# Patient Record
Sex: FEMALE | Race: WHITE
[De-identification: names, ages, dates, MRNs, and addresses within clinical notes are randomized per-mention and may not be internally consistent; named-entity substitution may affect disease eponyms.]

---

## 2017-04-12 ENCOUNTER — HOSPITAL ENCOUNTER (OUTPATIENT)
Dept: HOSPITAL 53 - M LABDRAW1 | Age: 72
End: 2017-04-12
Attending: FAMILY MEDICINE
Payer: COMMERCIAL

## 2017-04-12 DIAGNOSIS — I10: ICD-10-CM

## 2017-04-12 DIAGNOSIS — E83.42: ICD-10-CM

## 2017-04-12 DIAGNOSIS — D50.9: ICD-10-CM

## 2017-04-12 DIAGNOSIS — E78.2: Primary | ICD-10-CM

## 2017-04-12 LAB
ALBUMIN SERPL BCG-MCNC: 3.8 GM/DL (ref 3.2–5.2)
ALBUMIN/GLOB SERPL: 1.27 {RATIO} (ref 1–1.93)
ALP SERPL-CCNC: 79 U/L (ref 45–117)
ALT SERPL W P-5'-P-CCNC: 24 U/L (ref 12–78)
ANION GAP SERPL CALC-SCNC: 9 MEQ/L (ref 8–16)
AST SERPL-CCNC: 13 U/L (ref 15–37)
BASOPHILS # BLD AUTO: 0 K/MM3 (ref 0–0.2)
BASOPHILS NFR BLD AUTO: 0.5 % (ref 0–1)
BILIRUB SERPL-MCNC: 0.3 MG/DL (ref 0.2–1)
BUN SERPL-MCNC: 22 MG/DL (ref 7–18)
CALCIUM SERPL-MCNC: 9.5 MG/DL (ref 8.8–10.2)
CHLORIDE SERPL-SCNC: 104 MEQ/L (ref 98–107)
CHOLEST SERPL-MCNC: 162 MG/DL (ref ?–200)
CO2 SERPL-SCNC: 29 MEQ/L (ref 21–32)
CREAT SERPL-MCNC: 1.26 MG/DL (ref 0.55–1.02)
EOSINOPHIL # BLD AUTO: 0.2 K/MM3 (ref 0–0.5)
EOSINOPHIL NFR BLD AUTO: 2.7 % (ref 0–3)
ERYTHROCYTE [DISTWIDTH] IN BLOOD BY AUTOMATED COUNT: 12.7 % (ref 11.5–14.5)
GFR SERPL CREATININE-BSD FRML MDRD: 44.3 ML/MIN/{1.73_M2} (ref 39–?)
GLUCOSE SERPL-MCNC: 100 MG/DL (ref 83–110)
LARGE UNSTAINED CELL #: 0.1 K/MM3 (ref 0–0.4)
LARGE UNSTAINED CELL %: 1.4 % (ref 0–4)
LYMPHOCYTES # BLD AUTO: 3.2 K/MM3 (ref 1.5–4.5)
LYMPHOCYTES NFR BLD AUTO: 38.1 % (ref 24–44)
MAGNESIUM SERPL-MCNC: 2.4 MG/DL (ref 1.8–2.4)
MCH RBC QN AUTO: 32 PG (ref 27–33)
MCHC RBC AUTO-ENTMCNC: 32.7 G/DL (ref 32–36.5)
MCV RBC AUTO: 97.8 FL (ref 80–96)
MONOCYTES # BLD AUTO: 0.3 K/MM3 (ref 0–0.8)
MONOCYTES NFR BLD AUTO: 3.7 % (ref 0–5)
NEUTROPHILS # BLD AUTO: 4.3 K/MM3 (ref 1.8–7.7)
NEUTROPHILS NFR BLD AUTO: 53.6 % (ref 36–66)
PLATELET # BLD AUTO: 200 K/MM3 (ref 150–450)
POTASSIUM SERPL-SCNC: 4.2 MEQ/L (ref 3.5–5.1)
PROT SERPL-MCNC: 6.8 GM/DL (ref 6.4–8.2)
SODIUM SERPL-SCNC: 142 MEQ/L (ref 136–145)
TRIGL SERPL-MCNC: 150 MG/DL (ref ?–150)
WBC # BLD AUTO: 8.1 K/MM3 (ref 4–10)

## 2017-09-28 ENCOUNTER — HOSPITAL ENCOUNTER (OUTPATIENT)
Dept: HOSPITAL 53 - M LABDRAW1 | Age: 72
End: 2017-09-28
Attending: FAMILY MEDICINE
Payer: COMMERCIAL

## 2017-09-28 DIAGNOSIS — E83.42: ICD-10-CM

## 2017-09-28 DIAGNOSIS — D50.9: ICD-10-CM

## 2017-09-28 DIAGNOSIS — I10: Primary | ICD-10-CM

## 2017-09-28 DIAGNOSIS — E78.2: ICD-10-CM

## 2017-09-28 LAB
ALBUMIN SERPL BCG-MCNC: 3.5 GM/DL (ref 3.2–5.2)
ALBUMIN/GLOB SERPL: 1.17 {RATIO} (ref 1–1.93)
ALP SERPL-CCNC: 68 U/L (ref 45–117)
ALT SERPL W P-5'-P-CCNC: 23 U/L (ref 12–78)
ANION GAP SERPL CALC-SCNC: 7 MEQ/L (ref 8–16)
AST SERPL-CCNC: 14 U/L (ref 15–37)
BASOPHILS # BLD AUTO: 0 10^3/UL (ref 0–0.2)
BASOPHILS NFR BLD AUTO: 0.5 % (ref 0–1)
BILIRUB SERPL-MCNC: 0.2 MG/DL (ref 0.2–1)
BUN SERPL-MCNC: 19 MG/DL (ref 7–18)
CALCIUM SERPL-MCNC: 9.7 MG/DL (ref 8.8–10.2)
CHLORIDE SERPL-SCNC: 106 MEQ/L (ref 98–107)
CHOLEST SERPL-MCNC: 139 MG/DL (ref ?–200)
CO2 SERPL-SCNC: 30 MEQ/L (ref 21–32)
CREAT SERPL-MCNC: 1.2 MG/DL (ref 0.55–1.02)
EOSINOPHIL # BLD AUTO: 0.2 10^3/UL (ref 0–0.5)
EOSINOPHIL NFR BLD AUTO: 2.2 % (ref 0–3)
ERYTHROCYTE [DISTWIDTH] IN BLOOD BY AUTOMATED COUNT: 12.8 % (ref 11.5–14.5)
GFR SERPL CREATININE-BSD FRML MDRD: 46.8 ML/MIN/{1.73_M2} (ref 39–?)
GLUCOSE SERPL-MCNC: 99 MG/DL (ref 83–110)
IMM GRANULOCYTES NFR BLD: 0.3 % (ref 0–0)
LYMPHOCYTES # BLD AUTO: 3.3 10^3/UL (ref 1.5–4.5)
LYMPHOCYTES NFR BLD AUTO: 42.4 % (ref 24–44)
MAGNESIUM SERPL-MCNC: 2.3 MG/DL (ref 1.8–2.4)
MCH RBC QN AUTO: 33 PG (ref 27–33)
MCHC RBC AUTO-ENTMCNC: 33.3 G/DL (ref 32–36.5)
MCV RBC AUTO: 99.1 FL (ref 80–96)
MONOCYTES # BLD AUTO: 0.4 10^3/UL (ref 0–0.8)
MONOCYTES NFR BLD AUTO: 5.3 % (ref 0–5)
NEUTROPHILS # BLD AUTO: 3.9 10^3/UL (ref 1.8–7.7)
NEUTROPHILS NFR BLD AUTO: 49.3 % (ref 36–66)
NRBC BLD AUTO-RTO: 0 % (ref 0–0)
PLATELET # BLD AUTO: 206 10^3/UL (ref 150–450)
POTASSIUM SERPL-SCNC: 4.5 MEQ/L (ref 3.5–5.1)
PROT SERPL-MCNC: 6.5 GM/DL (ref 6.4–8.2)
SODIUM SERPL-SCNC: 143 MEQ/L (ref 136–145)
TRIGL SERPL-MCNC: 174 MG/DL (ref ?–150)
WBC # BLD AUTO: 7.9 10^3/UL (ref 4–10)

## 2017-10-26 ENCOUNTER — HOSPITAL ENCOUNTER (EMERGENCY)
Dept: HOSPITAL 53 - M ED | Age: 72
Discharge: HOME | End: 2017-10-26
Payer: COMMERCIAL

## 2017-10-26 VITALS — HEIGHT: 64 IN | BODY MASS INDEX: 29.94 KG/M2 | WEIGHT: 175.38 LBS

## 2017-10-26 VITALS — SYSTOLIC BLOOD PRESSURE: 143 MMHG | DIASTOLIC BLOOD PRESSURE: 66 MMHG

## 2017-10-26 DIAGNOSIS — R07.9: Primary | ICD-10-CM

## 2017-10-26 DIAGNOSIS — I25.10: ICD-10-CM

## 2017-10-26 DIAGNOSIS — J90: ICD-10-CM

## 2017-10-26 DIAGNOSIS — I10: ICD-10-CM

## 2017-10-26 DIAGNOSIS — E78.5: ICD-10-CM

## 2017-10-26 LAB
ALBUMIN SERPL BCG-MCNC: 4.1 GM/DL (ref 3.2–5.2)
ALBUMIN/GLOB SERPL: 1.28 {RATIO} (ref 1–1.93)
ALP SERPL-CCNC: 75 U/L (ref 45–117)
ALT SERPL W P-5'-P-CCNC: 27 U/L (ref 12–78)
ANION GAP SERPL CALC-SCNC: 10 MEQ/L (ref 8–16)
AST SERPL-CCNC: 14 U/L (ref 15–37)
BASOPHILS # BLD AUTO: 0 10^3/UL (ref 0–0.2)
BASOPHILS NFR BLD AUTO: 0.3 % (ref 0–1)
BILIRUB CONJ SERPL-MCNC: < 0.1 MG/DL (ref 0–0.2)
BILIRUB SERPL-MCNC: 0.3 MG/DL (ref 0.2–1)
BUN SERPL-MCNC: 23 MG/DL (ref 7–18)
CALCIUM SERPL-MCNC: 9.7 MG/DL (ref 8.8–10.2)
CHLORIDE SERPL-SCNC: 101 MEQ/L (ref 98–107)
CO2 SERPL-SCNC: 30 MEQ/L (ref 21–32)
CREAT SERPL-MCNC: 1.33 MG/DL (ref 0.55–1.02)
EOSINOPHIL # BLD AUTO: 0.2 10^3/UL (ref 0–0.5)
EOSINOPHIL NFR BLD AUTO: 2.1 % (ref 0–3)
ERYTHROCYTE [DISTWIDTH] IN BLOOD BY AUTOMATED COUNT: 12.7 % (ref 11.5–14.5)
GFR SERPL CREATININE-BSD FRML MDRD: 41.5 ML/MIN/{1.73_M2} (ref 39–?)
GLUCOSE SERPL-MCNC: 113 MG/DL (ref 83–110)
IMM GRANULOCYTES NFR BLD: 0.1 % (ref 0–0)
LYMPHOCYTES # BLD AUTO: 3.1 10^3/UL (ref 1.5–4.5)
LYMPHOCYTES NFR BLD AUTO: 35 % (ref 24–44)
MCH RBC QN AUTO: 33.3 PG (ref 27–33)
MCHC RBC AUTO-ENTMCNC: 33.4 G/DL (ref 32–36.5)
MCV RBC AUTO: 99.7 FL (ref 80–96)
MONOCYTES # BLD AUTO: 0.5 10^3/UL (ref 0–0.8)
MONOCYTES NFR BLD AUTO: 5.3 % (ref 0–5)
NEUTROPHILS # BLD AUTO: 5.1 10^3/UL (ref 1.8–7.7)
NEUTROPHILS NFR BLD AUTO: 57.2 % (ref 36–66)
NRBC BLD AUTO-RTO: 0 % (ref 0–0)
PLATELET # BLD AUTO: 217 10^3/UL (ref 150–450)
POTASSIUM SERPL-SCNC: 3.7 MEQ/L (ref 3.5–5.1)
PROT SERPL-MCNC: 7.3 GM/DL (ref 6.4–8.2)
SODIUM SERPL-SCNC: 141 MEQ/L (ref 136–145)
WBC # BLD AUTO: 8.9 10^3/UL (ref 4–10)

## 2017-10-26 PROCEDURE — 80076 HEPATIC FUNCTION PANEL: CPT

## 2017-10-26 PROCEDURE — 93041 RHYTHM ECG TRACING: CPT

## 2017-10-26 PROCEDURE — 99284 EMERGENCY DEPT VISIT MOD MDM: CPT

## 2017-10-26 PROCEDURE — 83880 ASSAY OF NATRIURETIC PEPTIDE: CPT

## 2017-10-26 PROCEDURE — 71020: CPT

## 2017-10-26 PROCEDURE — 94760 N-INVAS EAR/PLS OXIMETRY 1: CPT

## 2017-10-26 PROCEDURE — 80048 BASIC METABOLIC PNL TOTAL CA: CPT

## 2017-10-26 PROCEDURE — 96374 THER/PROPH/DIAG INJ IV PUSH: CPT

## 2017-10-26 PROCEDURE — 83690 ASSAY OF LIPASE: CPT

## 2017-10-26 PROCEDURE — 82550 ASSAY OF CK (CPK): CPT

## 2017-10-26 PROCEDURE — 93005 ELECTROCARDIOGRAM TRACING: CPT

## 2017-10-26 PROCEDURE — 82553 CREATINE MB FRACTION: CPT

## 2017-10-26 PROCEDURE — 85025 COMPLETE CBC W/AUTO DIFF WBC: CPT

## 2017-10-26 PROCEDURE — 84484 ASSAY OF TROPONIN QUANT: CPT

## 2017-10-26 PROCEDURE — 71275 CT ANGIOGRAPHY CHEST: CPT

## 2017-10-26 NOTE — REP
PA and lateral chest:

 

Comparison is 06/06/2012.

 

There is kyphosis foreshortening the lung fields on the PA view.

 

The lung fields are clear.  Cardiac size is normal.  The darion, mediastinum, bony

thorax are unremarkable except for kyphosis.

 

There are numerous surgical clips in the abdominal right upper quadrant.

 

Impression:

 

There are no acute cardiopulmonary findings.

 

 

Signed by

Vivek Jara MD 10/26/2017 06:53 P

## 2017-10-26 NOTE — REPUSA
CT angiogram of the chest

Clinical statement: Chest pain and shortness of breath.

Technique: Multiple axial CT images were obtained from the thoracic inlet through the upper abdomen a
fter a bolus administration of nonionic intravenous contrast. Coronal and sagittal reconstructions we
re also obtained.

No comparison is available.

Findings: The pulmonary arteries are well-opacified with contrast, with no intraluminal filling defec
ts to suggest embolism. The thoracic aorta is unremarkable. Thyroid gland is within normal limits. Th
ere is no thoracic lymphadenopathy. There is a tiny left-sided pleural effusion with minimal left low
er lobe atelectasis. Limited imaging of the upper abdomen is unremarkable. There are no suspicious os
seous lesions.

Impression:

1. No evidence of pulmonary embolism.

2. Tiny left-sided pleural effusion with minimal left lower lobe atelectasis.

     Electronically signed by BECKA SUNG MD on 10/26/2017 07:55:27 PM ET

## 2017-10-27 NOTE — ECGEPIP
Stationary ECG Study

                           [facility] - ED

                                       

                                       Test Date:    2017-10-26

Pat Name:     MELANY SALAS               Department:   

Patient ID:   [id number]                 Room:         -

Gender:       F                        Technician:   ct

:          1945               Requested By: Maja Lundborg-Gray 

Order Number: [id number]     Reading MD:   Gloria Motley

                                 Measurements

Intervals                              Axis          

Rate:         76                       P:            29

OR:           176                      QRS:          -29

QRSD:         115                      T:            5

QT:           375                                    

QTc:          423                                    

                           Interpretive Statements

SINUS RHYTHM WITH SINUS ARRHYTHMIA

LEFT AXIS DEVIATION

POSSIBLE LEFT VENTRICULAR HYPERTROPHY

NO PRIOR FOR COMPARISON

Electronically Signed On 10- 7:23:30 EDT by Gloria Motley

## 2018-05-25 ENCOUNTER — HOSPITAL ENCOUNTER (OUTPATIENT)
Dept: HOSPITAL 53 - M LABDRAW1 | Age: 73
End: 2018-05-25
Attending: PHYSICIAN ASSISTANT
Payer: COMMERCIAL

## 2018-05-25 DIAGNOSIS — A08.4: Primary | ICD-10-CM

## 2018-05-25 LAB
ANION GAP: 12 MEQ/L (ref 8–16)
BLOOD UREA NITROGEN: 23 MG/DL (ref 7–18)
CALCIUM LEVEL: 8.7 MG/DL (ref 8.8–10.2)
CARBON DIOXIDE LEVEL: 25 MEQ/L (ref 21–32)
CHLORIDE LEVEL: 93 MEQ/L (ref 98–107)
CREATININE FOR GFR: 1.22 MG/DL (ref 0.55–1.3)
GFR SERPL CREATININE-BSD FRML MDRD: 45.7 ML/MIN/{1.73_M2} (ref 39–?)
GLUCOSE, FASTING: 89 MG/DL (ref 70–100)
MAGNESIUM LEVEL: 2.2 MG/DL (ref 1.8–2.4)
POTASSIUM SERUM: 3 MEQ/L (ref 3.5–5.1)
SODIUM LEVEL: 130 MEQ/L (ref 136–145)

## 2018-05-25 PROCEDURE — 83735 ASSAY OF MAGNESIUM: CPT

## 2018-06-01 ENCOUNTER — HOSPITAL ENCOUNTER (OUTPATIENT)
Dept: HOSPITAL 53 - M LAB REF | Age: 73
End: 2018-06-01
Attending: PHYSICIAN ASSISTANT
Payer: COMMERCIAL

## 2018-06-01 DIAGNOSIS — R19.7: Primary | ICD-10-CM

## 2018-06-01 PROCEDURE — 87507 IADNA-DNA/RNA PROBE TQ 12-25: CPT

## 2018-06-18 ENCOUNTER — HOSPITAL ENCOUNTER (OUTPATIENT)
Dept: HOSPITAL 53 - M LABDRAW1 | Age: 73
End: 2018-06-18
Attending: PHYSICIAN ASSISTANT
Payer: COMMERCIAL

## 2018-06-18 DIAGNOSIS — E87.8: Primary | ICD-10-CM

## 2018-06-18 LAB
ANION GAP: 12 MEQ/L (ref 8–16)
BLOOD UREA NITROGEN: 19 MG/DL (ref 7–18)
CALCIUM LEVEL: 10.4 MG/DL (ref 8.8–10.2)
CARBON DIOXIDE LEVEL: 27 MEQ/L (ref 21–32)
CHLORIDE LEVEL: 103 MEQ/L (ref 98–107)
CREATININE FOR GFR: 1.35 MG/DL (ref 0.55–1.3)
GFR SERPL CREATININE-BSD FRML MDRD: 40.7 ML/MIN/{1.73_M2} (ref 39–?)
GLUCOSE, FASTING: 174 MG/DL (ref 70–100)
POTASSIUM SERUM: 4.2 MEQ/L (ref 3.5–5.1)
SODIUM LEVEL: 142 MEQ/L (ref 136–145)

## 2018-06-18 PROCEDURE — 80048 BASIC METABOLIC PNL TOTAL CA: CPT

## 2018-10-08 ENCOUNTER — HOSPITAL ENCOUNTER (OUTPATIENT)
Dept: HOSPITAL 53 - M LABDRAW1 | Age: 73
End: 2018-10-08
Attending: FAMILY MEDICINE
Payer: COMMERCIAL

## 2018-10-08 DIAGNOSIS — E78.2: ICD-10-CM

## 2018-10-08 DIAGNOSIS — D50.9: Primary | ICD-10-CM

## 2018-10-08 DIAGNOSIS — I10: ICD-10-CM

## 2018-10-08 LAB
ANION GAP: 4 MEQ/L (ref 8–16)
BASO #: 0 10^3/UL (ref 0–0.2)
BASO %: 0.4 % (ref 0–1)
BLOOD UREA NITROGEN: 19 MG/DL (ref 7–18)
CALCIUM LEVEL: 9.2 MG/DL (ref 8.8–10.2)
CARBON DIOXIDE LEVEL: 31 MEQ/L (ref 21–32)
CHLORIDE LEVEL: 107 MEQ/L (ref 98–107)
CHOLEST SERPL-MCNC: 152 MG/DL (ref ?–200)
CHOLESTEROL RISK RATIO: 2.67 (ref ?–5)
CREATININE FOR GFR: 1.27 MG/DL (ref 0.55–1.3)
EOS #: 0.2 10^3/UL (ref 0–0.5)
EOSINOPHIL NFR BLD AUTO: 2.5 % (ref 0–3)
FERRITIN: 67 NG/ML (ref 8–252)
GFR SERPL CREATININE-BSD FRML MDRD: 43.7 ML/MIN/{1.73_M2} (ref 39–?)
GLUCOSE, FASTING: 100 MG/DL (ref 70–100)
HDLC SERPL-MCNC: 57 MG/DL (ref 40–?)
HEMATOCRIT: 35.7 % (ref 36–47)
HEMOGLOBIN: 11.6 G/DL (ref 12–15.5)
IMMATURE GRANULOCYTE %: 0.3 % (ref 0–3)
IRON (FE): 57 UG/DL (ref 50–170)
IRON SATN MFR SERPL: 15.7 % (ref 13.2–45)
LDL CHOLESTEROL: 66 MG/DL (ref ?–100)
LYMPH #: 3 10^3/UL (ref 1.5–4.5)
LYMPH %: 43.9 % (ref 24–44)
MEAN CORPUSCULAR HEMOGLOBIN: 32.1 PG (ref 27–33)
MEAN CORPUSCULAR HGB CONC: 32.5 G/DL (ref 32–36.5)
MEAN CORPUSCULAR VOLUME: 98.9 FL (ref 80–96)
MONO #: 0.4 10^3/UL (ref 0–0.8)
MONO %: 5.5 % (ref 0–5)
NEUTROPHILS #: 3.3 10^3/UL (ref 1.8–7.7)
NEUTROPHILS %: 47.4 % (ref 36–66)
NONHDLC SERPL-MCNC: 95 MG/DL
NRBC BLD AUTO-RTO: 0 % (ref 0–0)
PLATELET COUNT, AUTOMATED: 219 10^3/UL (ref 150–450)
POTASSIUM SERUM: 5 MEQ/L (ref 3.5–5.1)
RED BLOOD COUNT: 3.61 10^6/UL (ref 4–5.4)
RED CELL DISTRIBUTION WIDTH: 12.9 % (ref 11.5–14.5)
SODIUM LEVEL: 142 MEQ/L (ref 136–145)
TOTAL IRON BINDING CAPACITY: 363 UG/DL (ref 250–450)
TRIGLYCERIDES LEVEL: 146 MG/DL (ref ?–150)
WHITE BLOOD COUNT: 6.9 10^3/UL (ref 4–10)

## 2018-10-08 PROCEDURE — 83550 IRON BINDING TEST: CPT

## 2019-04-04 ENCOUNTER — HOSPITAL ENCOUNTER (OUTPATIENT)
Dept: HOSPITAL 53 - M LABDRAW1 | Age: 74
End: 2019-04-04
Attending: PHYSICIAN ASSISTANT
Payer: COMMERCIAL

## 2019-04-04 DIAGNOSIS — I10: ICD-10-CM

## 2019-04-04 DIAGNOSIS — E78.2: ICD-10-CM

## 2019-04-04 DIAGNOSIS — E55.9: ICD-10-CM

## 2019-04-04 DIAGNOSIS — Z00.00: Primary | ICD-10-CM

## 2019-04-04 LAB
25(OH)D3 SERPL-MCNC: 45.3 NG/ML (ref 30–100)
BASOPHILS # BLD AUTO: 0 10^3/UL (ref 0–0.2)
BASOPHILS NFR BLD AUTO: 0.5 % (ref 0–1)
BUN SERPL-MCNC: 27 MG/DL (ref 7–18)
CALCIUM SERPL-MCNC: 10.2 MG/DL (ref 8.8–10.2)
CHLORIDE SERPL-SCNC: 104 MEQ/L (ref 98–107)
CHOLEST SERPL-MCNC: 176 MG/DL (ref ?–200)
CHOLEST/HDLC SERPL: 2.93 {RATIO} (ref ?–5)
CO2 SERPL-SCNC: 29 MEQ/L (ref 21–32)
CREAT SERPL-MCNC: 1.31 MG/DL (ref 0.55–1.3)
EOSINOPHIL # BLD AUTO: 0.3 10^3/UL (ref 0–0.5)
EOSINOPHIL NFR BLD AUTO: 2.9 % (ref 0–3)
GFR SERPL CREATININE-BSD FRML MDRD: 42.1 ML/MIN/{1.73_M2} (ref 39–?)
GLUCOSE SERPL-MCNC: 100 MG/DL (ref 70–100)
HCT VFR BLD AUTO: 39.1 % (ref 36–47)
HDLC SERPL-MCNC: 60 MG/DL (ref 40–?)
HGB BLD-MCNC: 12.8 G/DL (ref 12–15.5)
LDLC SERPL CALC-MCNC: 71 MG/DL (ref ?–100)
LYMPHOCYTES # BLD AUTO: 4.3 10^3/UL (ref 1.5–4.5)
LYMPHOCYTES NFR BLD AUTO: 48.6 % (ref 24–44)
MCH RBC QN AUTO: 32.5 PG (ref 27–33)
MCHC RBC AUTO-ENTMCNC: 32.7 G/DL (ref 32–36.5)
MCV RBC AUTO: 99.2 FL (ref 80–96)
MONOCYTES # BLD AUTO: 0.5 10^3/UL (ref 0–0.8)
MONOCYTES NFR BLD AUTO: 5.9 % (ref 0–5)
NEUTROPHILS # BLD AUTO: 3.7 10^3/UL (ref 1.8–7.7)
NEUTROPHILS NFR BLD AUTO: 41.9 % (ref 36–66)
NONHDLC SERPL-MCNC: 116 MG/DL
PLATELET # BLD AUTO: 247 10^3/UL (ref 150–450)
POTASSIUM SERPL-SCNC: 4.1 MEQ/L (ref 3.5–5.1)
RBC # BLD AUTO: 3.94 10^6/UL (ref 4–5.4)
SODIUM SERPL-SCNC: 140 MEQ/L (ref 136–145)
TRIGL SERPL-MCNC: 224 MG/DL (ref ?–150)
WBC # BLD AUTO: 8.8 10^3/UL (ref 4–10)

## 2019-10-03 ENCOUNTER — HOSPITAL ENCOUNTER (OUTPATIENT)
Dept: HOSPITAL 53 - M LABDRAW1 | Age: 74
End: 2019-10-03
Attending: PHYSICIAN ASSISTANT
Payer: COMMERCIAL

## 2019-10-03 DIAGNOSIS — E55.9: ICD-10-CM

## 2019-10-03 DIAGNOSIS — E78.2: ICD-10-CM

## 2019-10-03 DIAGNOSIS — I10: ICD-10-CM

## 2019-10-03 DIAGNOSIS — Z79.899: ICD-10-CM

## 2019-10-03 DIAGNOSIS — Z00.00: Primary | ICD-10-CM

## 2019-10-03 LAB
25(OH)D3 SERPL-MCNC: 50.2 NG/ML (ref 30–100)
BASOPHILS # BLD AUTO: 0 10^3/UL (ref 0–0.2)
BASOPHILS NFR BLD AUTO: 0.6 % (ref 0–1)
BUN SERPL-MCNC: 19 MG/DL (ref 7–18)
CALCIUM SERPL-MCNC: 9.9 MG/DL (ref 8.8–10.2)
CHLORIDE SERPL-SCNC: 106 MEQ/L (ref 98–107)
CHOLEST SERPL-MCNC: 150 MG/DL (ref ?–200)
CHOLEST/HDLC SERPL: 3 {RATIO} (ref ?–5)
CO2 SERPL-SCNC: 29 MEQ/L (ref 21–32)
CREAT SERPL-MCNC: 1.39 MG/DL (ref 0.55–1.3)
EOSINOPHIL # BLD AUTO: 0.2 10^3/UL (ref 0–0.5)
EOSINOPHIL NFR BLD AUTO: 3.2 % (ref 0–3)
GFR SERPL CREATININE-BSD FRML MDRD: 39.2 ML/MIN/{1.73_M2} (ref 39–?)
GLUCOSE SERPL-MCNC: 92 MG/DL (ref 70–100)
HCT VFR BLD AUTO: 35.9 % (ref 36–47)
HDLC SERPL-MCNC: 50 MG/DL (ref 40–?)
HGB BLD-MCNC: 11.8 G/DL (ref 12–15.5)
LDLC SERPL CALC-MCNC: 50 MG/DL (ref ?–100)
LYMPHOCYTES # BLD AUTO: 3 10^3/UL (ref 1.5–5)
LYMPHOCYTES NFR BLD AUTO: 43.6 % (ref 24–44)
MCH RBC QN AUTO: 33.5 PG (ref 27–33)
MCHC RBC AUTO-ENTMCNC: 32.9 G/DL (ref 32–36.5)
MCV RBC AUTO: 102 FL (ref 80–96)
MONOCYTES # BLD AUTO: 0.4 10^3/UL (ref 0–0.8)
MONOCYTES NFR BLD AUTO: 5.8 % (ref 0–5)
NEUTROPHILS # BLD AUTO: 3.2 10^3/UL (ref 1.5–8.5)
NEUTROPHILS NFR BLD AUTO: 46.7 % (ref 36–66)
NONHDLC SERPL-MCNC: 100 MG/DL
PLATELET # BLD AUTO: 215 10^3/UL (ref 150–450)
POTASSIUM SERPL-SCNC: 4.3 MEQ/L (ref 3.5–5.1)
RBC # BLD AUTO: 3.52 10^6/UL (ref 4–5.4)
SODIUM SERPL-SCNC: 141 MEQ/L (ref 136–145)
TRIGL SERPL-MCNC: 249 MG/DL (ref ?–150)
WBC # BLD AUTO: 6.9 10^3/UL (ref 4–10)

## 2020-05-27 ENCOUNTER — HOSPITAL ENCOUNTER (OUTPATIENT)
Dept: HOSPITAL 53 - M LAB | Age: 75
End: 2020-05-27
Attending: PHYSICIAN ASSISTANT
Payer: MEDICARE

## 2020-05-27 DIAGNOSIS — D50.9: ICD-10-CM

## 2020-05-27 DIAGNOSIS — E55.9: ICD-10-CM

## 2020-05-27 DIAGNOSIS — I10: Primary | ICD-10-CM

## 2020-05-27 LAB
25(OH)D3 SERPL-MCNC: 54.2 NG/ML (ref 30–100)
ALBUMIN SERPL BCG-MCNC: 3.8 GM/DL (ref 3.2–5.2)
ALT SERPL W P-5'-P-CCNC: 22 U/L (ref 12–78)
BASOPHILS # BLD AUTO: 0 10^3/UL (ref 0–0.2)
BASOPHILS NFR BLD AUTO: 0.5 % (ref 0–1)
BILIRUB SERPL-MCNC: 0.4 MG/DL (ref 0.2–1)
BUN SERPL-MCNC: 19 MG/DL (ref 7–18)
CALCIUM SERPL-MCNC: 9.4 MG/DL (ref 8.8–10.2)
CHLORIDE SERPL-SCNC: 108 MEQ/L (ref 98–107)
CO2 SERPL-SCNC: 29 MEQ/L (ref 21–32)
CREAT SERPL-MCNC: 1.18 MG/DL (ref 0.55–1.3)
EOSINOPHIL # BLD AUTO: 0.2 10^3/UL (ref 0–0.5)
EOSINOPHIL NFR BLD AUTO: 3.2 % (ref 0–3)
FERRITIN SERPL-MCNC: 64 NG/ML (ref 8–252)
GFR SERPL CREATININE-BSD FRML MDRD: 47.3 ML/MIN/{1.73_M2} (ref 39–?)
GLUCOSE SERPL-MCNC: 102 MG/DL (ref 70–100)
HCT VFR BLD AUTO: 35.9 % (ref 36–47)
HGB BLD-MCNC: 11.8 G/DL (ref 12–15.5)
IRON SATN MFR SERPL: 23.8 % (ref 13.2–45)
IRON SERPL-MCNC: 79 UG/DL (ref 50–170)
LYMPHOCYTES # BLD AUTO: 2.6 10^3/UL (ref 1.5–5)
LYMPHOCYTES NFR BLD AUTO: 43 % (ref 24–44)
MAGNESIUM SERPL-MCNC: 2.3 MG/DL (ref 1.8–2.4)
MCH RBC QN AUTO: 32.8 PG (ref 27–33)
MCHC RBC AUTO-ENTMCNC: 32.9 G/DL (ref 32–36.5)
MCV RBC AUTO: 99.7 FL (ref 80–96)
MONOCYTES # BLD AUTO: 0.3 10^3/UL (ref 0–0.8)
MONOCYTES NFR BLD AUTO: 5.7 % (ref 0–5)
NEUTROPHILS # BLD AUTO: 2.8 10^3/UL (ref 1.5–8.5)
NEUTROPHILS NFR BLD AUTO: 47.3 % (ref 36–66)
PLATELET # BLD AUTO: 188 10^3/UL (ref 150–450)
POTASSIUM SERPL-SCNC: 4.5 MEQ/L (ref 3.5–5.1)
PROT SERPL-MCNC: 6.8 GM/DL (ref 6.4–8.2)
RBC # BLD AUTO: 3.6 10^6/UL (ref 4–5.4)
SODIUM SERPL-SCNC: 144 MEQ/L (ref 136–145)
TIBC SERPL-MCNC: 332 UG/DL (ref 250–450)
WBC # BLD AUTO: 6 10^3/UL (ref 4–10)

## 2020-11-30 ENCOUNTER — HOSPITAL ENCOUNTER (OUTPATIENT)
Dept: HOSPITAL 53 - M PLALAB | Age: 75
End: 2020-11-30
Attending: PHYSICIAN ASSISTANT
Payer: MEDICARE

## 2020-11-30 DIAGNOSIS — D50.9: ICD-10-CM

## 2020-11-30 DIAGNOSIS — E55.9: ICD-10-CM

## 2020-11-30 DIAGNOSIS — E78.2: ICD-10-CM

## 2020-11-30 DIAGNOSIS — I10: Primary | ICD-10-CM

## 2020-11-30 LAB
25(OH)D3 SERPL-MCNC: 43.9 NG/ML (ref 30–100)
ALBUMIN SERPL BCG-MCNC: 3.7 GM/DL (ref 3.2–5.2)
ALT SERPL W P-5'-P-CCNC: 19 U/L (ref 12–78)
BASOPHILS # BLD AUTO: 0 10^3/UL (ref 0–0.2)
BASOPHILS NFR BLD AUTO: 0.6 % (ref 0–1)
BILIRUB SERPL-MCNC: 0.4 MG/DL (ref 0.2–1)
BUN SERPL-MCNC: 25 MG/DL (ref 7–18)
CALCIUM SERPL-MCNC: 9.8 MG/DL (ref 8.8–10.2)
CHLORIDE SERPL-SCNC: 108 MEQ/L (ref 98–107)
CHOLEST SERPL-MCNC: 156 MG/DL (ref ?–200)
CHOLEST/HDLC SERPL: 2.79 {RATIO} (ref ?–5)
CO2 SERPL-SCNC: 30 MEQ/L (ref 21–32)
CREAT SERPL-MCNC: 1.24 MG/DL (ref 0.55–1.3)
EOSINOPHIL # BLD AUTO: 0.2 10^3/UL (ref 0–0.5)
EOSINOPHIL NFR BLD AUTO: 2.8 % (ref 0–3)
FERRITIN SERPL-MCNC: 69 NG/ML (ref 8–252)
GFR SERPL CREATININE-BSD FRML MDRD: 44.7 ML/MIN/{1.73_M2} (ref 39–?)
GLUCOSE SERPL-MCNC: 106 MG/DL (ref 70–100)
HCT VFR BLD AUTO: 35.1 % (ref 36–47)
HDLC SERPL-MCNC: 56 MG/DL (ref 40–?)
HGB BLD-MCNC: 11.2 G/DL (ref 12–15.5)
IRON SATN MFR SERPL: 19 % (ref 13.2–45)
IRON SERPL-MCNC: 67 UG/DL (ref 50–170)
LDLC SERPL CALC-MCNC: 66 MG/DL (ref ?–100)
LYMPHOCYTES # BLD AUTO: 2.6 10^3/UL (ref 1.5–5)
LYMPHOCYTES NFR BLD AUTO: 37 % (ref 24–44)
MAGNESIUM SERPL-MCNC: 2.3 MG/DL (ref 1.8–2.4)
MCH RBC QN AUTO: 32.4 PG (ref 27–33)
MCHC RBC AUTO-ENTMCNC: 31.9 G/DL (ref 32–36.5)
MCV RBC AUTO: 101.4 FL (ref 80–96)
MONOCYTES # BLD AUTO: 0.4 10^3/UL (ref 0–0.8)
MONOCYTES NFR BLD AUTO: 5.8 % (ref 0–5)
NEUTROPHILS # BLD AUTO: 3.8 10^3/UL (ref 1.5–8.5)
NEUTROPHILS NFR BLD AUTO: 53.5 % (ref 36–66)
NONHDLC SERPL-MCNC: 100 MG/DL
PLATELET # BLD AUTO: 196 10^3/UL (ref 150–450)
POTASSIUM SERPL-SCNC: 5.6 MEQ/L (ref 3.5–5.1)
PROT SERPL-MCNC: 6.7 GM/DL (ref 6.4–8.2)
RBC # BLD AUTO: 3.46 10^6/UL (ref 4–5.4)
SODIUM SERPL-SCNC: 140 MEQ/L (ref 136–145)
TIBC SERPL-MCNC: 353 UG/DL (ref 250–450)
TRIGL SERPL-MCNC: 169 MG/DL (ref ?–150)
WBC # BLD AUTO: 7.1 10^3/UL (ref 4–10)

## 2021-01-05 ENCOUNTER — HOSPITAL ENCOUNTER (OUTPATIENT)
Dept: HOSPITAL 53 - M PLALAB | Age: 76
End: 2021-01-05
Attending: PHYSICIAN ASSISTANT
Payer: MEDICARE

## 2021-01-05 DIAGNOSIS — E87.5: Primary | ICD-10-CM

## 2021-05-28 ENCOUNTER — HOSPITAL ENCOUNTER (OUTPATIENT)
Dept: HOSPITAL 53 - M PLALAB | Age: 76
End: 2021-05-28
Attending: PHYSICIAN ASSISTANT
Payer: MEDICARE

## 2021-05-28 DIAGNOSIS — E78.2: Primary | ICD-10-CM

## 2021-05-28 LAB
ALBUMIN SERPL BCG-MCNC: 4 GM/DL (ref 3.2–5.2)
ALT SERPL W P-5'-P-CCNC: 25 U/L (ref 12–78)
BILIRUB SERPL-MCNC: 0.4 MG/DL (ref 0.2–1)
BUN SERPL-MCNC: 29 MG/DL (ref 7–18)
CALCIUM SERPL-MCNC: 9.8 MG/DL (ref 8.8–10.2)
CHLORIDE SERPL-SCNC: 106 MEQ/L (ref 98–107)
CHOLEST SERPL-MCNC: 181 MG/DL (ref ?–200)
CHOLEST/HDLC SERPL: 2.97 {RATIO} (ref ?–5)
CO2 SERPL-SCNC: 27 MEQ/L (ref 21–32)
CREAT SERPL-MCNC: 1.33 MG/DL (ref 0.55–1.3)
GFR SERPL CREATININE-BSD FRML MDRD: 41.1 ML/MIN/{1.73_M2} (ref 39–?)
GLUCOSE SERPL-MCNC: 149 MG/DL (ref 70–100)
HDLC SERPL-MCNC: 61 MG/DL (ref 40–?)
LDLC SERPL CALC-MCNC: 92 MG/DL (ref ?–100)
NONHDLC SERPL-MCNC: 120 MG/DL
POTASSIUM SERPL-SCNC: 4 MEQ/L (ref 3.5–5.1)
PROT SERPL-MCNC: 7 GM/DL (ref 6.4–8.2)
SODIUM SERPL-SCNC: 140 MEQ/L (ref 136–145)
TRIGL SERPL-MCNC: 139 MG/DL (ref ?–150)